# Patient Record
Sex: MALE | Race: BLACK OR AFRICAN AMERICAN | NOT HISPANIC OR LATINO | URBAN - METROPOLITAN AREA
[De-identification: names, ages, dates, MRNs, and addresses within clinical notes are randomized per-mention and may not be internally consistent; named-entity substitution may affect disease eponyms.]

---

## 2017-08-30 ENCOUNTER — EMERGENCY (EMERGENCY)
Facility: HOSPITAL | Age: 27
LOS: 1 days | Discharge: HOME | End: 2017-08-30
Admitting: PSYCHIATRY & NEUROLOGY
Payer: MEDICAID

## 2017-08-30 VITALS
HEART RATE: 112 BPM | OXYGEN SATURATION: 100 % | RESPIRATION RATE: 20 BRPM | SYSTOLIC BLOOD PRESSURE: 151 MMHG | DIASTOLIC BLOOD PRESSURE: 93 MMHG | TEMPERATURE: 98 F

## 2017-08-30 DIAGNOSIS — F84.0 AUTISTIC DISORDER: ICD-10-CM

## 2017-08-30 PROCEDURE — 93010 ELECTROCARDIOGRAM REPORT: CPT

## 2017-08-30 PROCEDURE — 99284 EMERGENCY DEPT VISIT MOD MDM: CPT | Mod: 25

## 2017-08-30 PROCEDURE — 90792 PSYCH DIAG EVAL W/MED SRVCS: CPT | Mod: GC

## 2017-08-30 NOTE — ED PROVIDER NOTE - OBJECTIVE STATEMENT
This is a 27 year old Male PMHx Autism ASD Cognitive impairment BIB family for psych eval r/t aggression. Pt calm and cooperative able to follow directions. Responds yes/no to questions secondary to limited verbal skills. Mother reports she has been having a harder time redirecting her son lately. Mother state he is aggressive at home, kicking doors and she feels frightened.

## 2017-08-30 NOTE — ED PROVIDER NOTE - MEDICAL DECISION MAKING DETAILS
This is a 27 year old Male PMHx Autism ASD Cognitive impairment BIB family for psych eval r/t aggression. Pt calm and cooperative able to follow directions. Responds yes/no to questions secondary to limited verbal skills. Mother reports she has been having a harder time redirecting her son lately. Mother state he is aggressive at home, kicking doors and she feels frightened.  Medical evaluation performed. There is no clinical evidence of intoxication or any acute medical problem requiring immediate intervention. Final disposition will be determined by psychiatrist.

## 2017-08-30 NOTE — ED BEHAVIORAL HEALTH ASSESSMENT NOTE - DESCRIPTION
calm cooperative none Pt with ASD, cognitively impaired, resides in private residence with mother and father, 2 brothers not involved in the patient's care, Pt has been in day program [facility], currently in process of completing workup to begin Partial Hospitalization day program at [facility].
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
## 2017-08-30 NOTE — ED BEHAVIORAL HEALTH ASSESSMENT NOTE - SUMMARY
Patient is a 28yo AA M with ASD, no significant PMHx, PPHx of 3 prior inpatient psychiatric admissions (last hospitalization 2 years prior), in outpatient psychiatric treatment (sees Dr. Marcial Contreras) hx of behavioral outbursts with low frustration tolerance since puberty, daily tobacco use, no known hx of substance use, no legal hx, BIB mother, in context of aggressive behavioral outburst.    Pt presents calm, cooperative, and in appropriate behavioral control while under observation in the ER. Patient's presentation of aggressive behaviors are in contest of chronic poor frustration tolerance, rigidity, and cognitive impairment associated with autism spectrum d/o. Pt with limited verbal skills, but able to deny thoughts of harming his mother. Given aggression is 2/2 to behavioral problem, inpatient psychiatric admission would not likely ameliorate this behavior, rather patient would benefit from long term behavioral therapy in an outpatient setting. Discussed held with patient's mother in regards to behavioral strategies at this time, to limit aggressive behaviors. She was provided with resources including information about nearby hospitals with CPEPs, information about the [facility], and instructed to reach out to psychiatrist Dr. Contreras for urgent follow-up. Message was left for Dr. Contreras by writer. Given patient able to demonstrate appropriate calm behaviors while in the ER, he does not present in imminent risk of harm, and does not meet criteria for involuntary psychiatric admission.

## 2017-08-30 NOTE — ED BEHAVIORAL HEALTH ASSESSMENT NOTE - RISK ASSESSMENT
Pt has baseline elevated risk of harm, given chronic risk factors of ASD, cognitive impairment, poor frustration tolerance, impulsivity, aggression, previous inpatient psychiatric hospitalizations, with acute risk factor of recent aggression. Pt has numerous protective factors including currently in treatment, compliant with medication and treatment, pt currently presents calm and cooperative in ER, denies S/H I/I/P, denies thoughts of harming mom, no previous reported SA or self injury per patients mother, family support system, no illicit substance misuse, no evidence of acute psychosis, on BARRERA. Although pt has chronic risk of harm of aggression in context to  unmodifiable risk factor of cognitive impairment from ASD, pt's symptoms would not likely be ameliorated by inpatient admission at this time.

## 2017-08-30 NOTE — ED BEHAVIORAL HEALTH ASSESSMENT NOTE - OTHER
intact during assessment, however historically impaired concrete, limited impaired limited/impaired 2/2 ASD unable to appropriately assess 2/2 limited verbal skills

## 2017-08-30 NOTE — ED BEHAVIORAL HEALTH ASSESSMENT NOTE - OTHER PAST PSYCHIATRIC HISTORY (INCLUDE DETAILS REGARDING ONSET, COURSE OF ILLNESS, INPATIENT/OUTPATIENT TREATMENT)
3 prior inpatient psychiatric hospitalizations. First hospitalized 7-8 years ago at [facility] and then subsequent hospitalization at [facility]. Most recent hospitalization at [facility] 2 years prior. All hospitalizations in context of aggressive behaviors per mother. Pt's mother reports pt began to present with worsening aggression 8 years ago- she is aware that the worsening aggression began likely 2/2 symptoms of puberty- which in this patient population frequently cause elevated aggression.

## 2017-08-30 NOTE — ED BEHAVIORAL HEALTH ASSESSMENT NOTE - SAFETY PLAN DETAILS
Discussed with pt's mother to call 911 or return to nearest ER if patient demonstrates aggressive behavior. Pt's mother provided with resources of [facility] hospitals in the area.

## 2017-08-30 NOTE — ED PROVIDER NOTE - PMH
<<----- Click to add NO pertinent Past Medical History ASD (atrial septal defect)    Autism    Cognitive impairment

## 2017-08-30 NOTE — ED PROVIDER NOTE - CARE PLAN
Principal Discharge DX:	Autism Principal Discharge DX:	Autism  Secondary Diagnosis:	Cognitive impairment

## 2017-08-30 NOTE — ED BEHAVIORAL HEALTH ASSESSMENT NOTE - CASE SUMMARY
pt seen and examined. case discussed with Dr. Gonzalez. In summary this is a 28yo AA M with ASD, no significant PMHx, PPHx of 3 prior inpatient psychiatric admissions (last hospitalization 2 years prior), in outpatient psychiatric treatment (sees Dr. Marcial Contreras) hx of behavioral outbursts with low frustration tolerance since puberty, daily tobacco use, no known hx of substance use, no legal hx, BIB mother, in context of aggressive behavioral outburst. On evaluation the pt is calm and cooperative. He demonstrates limited coping skills and cognitive abilities consistent withe the diagnosis of ASD, limited verbal abilities. In my medical opinion the pt is currently at his baseline mental status, poor coping skills and low frustration tolerance. Denies wanting to hurt himself or others. mother reports that the pt has been acting out when she sets limits while her  is away. pt would not benefit from psychiatric hospitalization. mother in agreement to take him home.

## 2017-08-30 NOTE — ED BEHAVIORAL HEALTH ASSESSMENT NOTE - REFERRAL / APPOINTMENT DETAILS
Message left for psychiatrist Dr. Contreras; pt's mother also instructed to call psychiatrist for urgent outpatient appointment on Monday (9/4/17) when Dr. Contreras returns to office.  Pt has neurologist appointment on 9/5/17
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
## 2018-01-12 ENCOUNTER — EMERGENCY (EMERGENCY)
Facility: HOSPITAL | Age: 28
LOS: 0 days | Discharge: HOME | End: 2018-01-12
Attending: EMERGENCY MEDICINE
Payer: COMMERCIAL

## 2018-01-12 VITALS
SYSTOLIC BLOOD PRESSURE: 156 MMHG | TEMPERATURE: 98 F | RESPIRATION RATE: 20 BRPM | HEIGHT: 70 IN | DIASTOLIC BLOOD PRESSURE: 93 MMHG | WEIGHT: 257.94 LBS | OXYGEN SATURATION: 96 % | HEART RATE: 104 BPM

## 2018-01-12 VITALS
OXYGEN SATURATION: 98 % | SYSTOLIC BLOOD PRESSURE: 146 MMHG | TEMPERATURE: 98 F | DIASTOLIC BLOOD PRESSURE: 93 MMHG | HEART RATE: 91 BPM | RESPIRATION RATE: 19 BRPM

## 2018-01-12 DIAGNOSIS — F79 UNSPECIFIED INTELLECTUAL DISABILITIES: ICD-10-CM

## 2018-01-12 DIAGNOSIS — F20.9 SCHIZOPHRENIA, UNSPECIFIED: ICD-10-CM

## 2018-01-12 DIAGNOSIS — F17.200 NICOTINE DEPENDENCE, UNSPECIFIED, UNCOMPLICATED: ICD-10-CM

## 2018-01-12 DIAGNOSIS — F84.0 AUTISTIC DISORDER: ICD-10-CM

## 2018-01-12 LAB
ALBUMIN SERPL ELPH-MCNC: 3.5 G/DL — SIGNIFICANT CHANGE UP (ref 3.3–5)
ALP SERPL-CCNC: 35 U/L — LOW (ref 40–120)
ALT FLD-CCNC: 18 U/L — SIGNIFICANT CHANGE UP (ref 12–78)
ANION GAP SERPL CALC-SCNC: 10 MMOL/L — SIGNIFICANT CHANGE UP (ref 5–17)
ANISOCYTOSIS BLD QL: SLIGHT — SIGNIFICANT CHANGE UP
APAP SERPL-MCNC: <2 UG/ML — LOW (ref 10–30)
AST SERPL-CCNC: 9 U/L — LOW (ref 15–37)
BASO STIPL BLD QL SMEAR: PRESENT — SIGNIFICANT CHANGE UP
BASOPHILS # BLD AUTO: 0.1 K/UL — SIGNIFICANT CHANGE UP (ref 0–0.2)
BASOPHILS NFR BLD AUTO: 0.9 % — SIGNIFICANT CHANGE UP (ref 0–2)
BILIRUB SERPL-MCNC: 0.3 MG/DL — SIGNIFICANT CHANGE UP (ref 0.2–1.2)
BUN SERPL-MCNC: 9 MG/DL — SIGNIFICANT CHANGE UP (ref 7–23)
CALCIUM SERPL-MCNC: 8.4 MG/DL — LOW (ref 8.5–10.1)
CHLORIDE SERPL-SCNC: 106 MMOL/L — SIGNIFICANT CHANGE UP (ref 96–108)
CO2 SERPL-SCNC: 23 MMOL/L — SIGNIFICANT CHANGE UP (ref 22–31)
CREAT SERPL-MCNC: 0.67 MG/DL — SIGNIFICANT CHANGE UP (ref 0.5–1.3)
EOSINOPHIL # BLD AUTO: 0.4 K/UL — SIGNIFICANT CHANGE UP (ref 0–0.5)
EOSINOPHIL NFR BLD AUTO: 4.1 % — SIGNIFICANT CHANGE UP (ref 0–6)
ETHANOL SERPL-MCNC: <10 MG/DL — SIGNIFICANT CHANGE UP (ref 0–10)
GLUCOSE SERPL-MCNC: 91 MG/DL — SIGNIFICANT CHANGE UP (ref 70–99)
HCT VFR BLD CALC: 44.2 % — SIGNIFICANT CHANGE UP (ref 39–50)
HGB BLD-MCNC: 13.3 G/DL — SIGNIFICANT CHANGE UP (ref 13–17)
HYPOCHROMIA BLD QL: SLIGHT — SIGNIFICANT CHANGE UP
LYMPHOCYTES # BLD AUTO: 4 K/UL — HIGH (ref 1–3.3)
LYMPHOCYTES # BLD AUTO: 43.6 % — SIGNIFICANT CHANGE UP (ref 13–44)
MCHC RBC-ENTMCNC: 19.2 PG — LOW (ref 27–34)
MCHC RBC-ENTMCNC: 30.2 GM/DL — LOW (ref 32–36)
MCV RBC AUTO: 63.7 FL — LOW (ref 80–100)
MICROCYTES BLD QL: SLIGHT — SIGNIFICANT CHANGE UP
MONOCYTES # BLD AUTO: 0.7 K/UL — SIGNIFICANT CHANGE UP (ref 0–0.9)
MONOCYTES NFR BLD AUTO: 7.5 % — SIGNIFICANT CHANGE UP (ref 2–14)
NEUTROPHILS # BLD AUTO: 4.1 K/UL — SIGNIFICANT CHANGE UP (ref 1.8–7.4)
NEUTROPHILS NFR BLD AUTO: 43.9 % — SIGNIFICANT CHANGE UP (ref 43–77)
PLAT MORPH BLD: NORMAL — SIGNIFICANT CHANGE UP
PLATELET # BLD AUTO: 258 K/UL — SIGNIFICANT CHANGE UP (ref 150–400)
POIKILOCYTOSIS BLD QL AUTO: SLIGHT — SIGNIFICANT CHANGE UP
POLYCHROMASIA BLD QL SMEAR: SLIGHT — SIGNIFICANT CHANGE UP
POTASSIUM SERPL-MCNC: 4 MMOL/L — SIGNIFICANT CHANGE UP (ref 3.5–5.3)
POTASSIUM SERPL-SCNC: 4 MMOL/L — SIGNIFICANT CHANGE UP (ref 3.5–5.3)
PROT SERPL-MCNC: 7.2 GM/DL — SIGNIFICANT CHANGE UP (ref 6–8.3)
RBC # BLD: 6.93 M/UL — HIGH (ref 4.2–5.8)
RBC # FLD: 15.2 % — HIGH (ref 11–15)
RBC BLD AUTO: ABNORMAL
SALICYLATES SERPL-MCNC: <1.7 MG/DL — LOW (ref 2.8–20)
SODIUM SERPL-SCNC: 139 MMOL/L — SIGNIFICANT CHANGE UP (ref 135–145)
WBC # BLD: 9.3 K/UL — SIGNIFICANT CHANGE UP (ref 3.8–10.5)
WBC # FLD AUTO: 9.3 K/UL — SIGNIFICANT CHANGE UP (ref 3.8–10.5)

## 2018-01-12 PROCEDURE — 99284 EMERGENCY DEPT VISIT MOD MDM: CPT | Mod: 25

## 2018-01-12 PROCEDURE — 90792 PSYCH DIAG EVAL W/MED SRVCS: CPT

## 2018-01-12 RX ORDER — RISPERIDONE 4 MG/1
4 TABLET ORAL ONCE
Qty: 0 | Refills: 0 | Status: COMPLETED | OUTPATIENT
Start: 2018-01-12 | End: 2018-01-12

## 2018-01-12 RX ORDER — BENZTROPINE MESYLATE 1 MG
0.5 TABLET ORAL ONCE
Qty: 0 | Refills: 0 | Status: COMPLETED | OUTPATIENT
Start: 2018-01-12 | End: 2018-01-12

## 2018-01-12 RX ADMIN — RISPERIDONE 4 MILLIGRAM(S): 4 TABLET ORAL at 11:27

## 2018-01-12 RX ADMIN — Medication 0.5 MILLIGRAM(S): at 11:27

## 2018-01-12 NOTE — ED BEHAVIORAL HEALTH ASSESSMENT NOTE - RISK ASSESSMENT
Chronic risks but no acute symptoms: mood stable, no statements of si or hi, no psychosis, calm, no aggression, compliant with program and meds, sleep poor though, nicotine addiction. supportive parents.

## 2018-01-12 NOTE — ED PROVIDER NOTE - MEDICAL DECISION MAKING DETAILS
Patient with autism, possible schizophrenic history.  VSS, labs & EKG wnl.  Unable to get response from patient's family.  Patient pending psych/SW this morning.  No apparent medical issues. Patient signed out to incoming physician.  All decisions regarding the progression of care will be made at their discretion.

## 2018-01-12 NOTE — ED BEHAVIORAL HEALTH ASSESSMENT NOTE - DESCRIPTION
Calm, sleeping comfortably. see hpi the patient is a 27 year old male, single, disabled, lives at home with parents

## 2018-01-12 NOTE — ED ADULT NURSE NOTE - CHPI ED SYMPTOMS NEG
no change in level of consciousness/no confusion/no disorientation/no weakness/no hallucinations/no agitation/no weight loss/no suicidal/no homicidal/no paranoia

## 2018-01-12 NOTE — ED PROVIDER NOTE - PHYSICAL EXAMINATION
Gen: Sleepy, obese, rousable  Head: NC, AT, EOMI, normal lids/conjunctiva  Neck: +supple, +Trachea midline  Pulm: Bilateral BS, normal resp effort, no wheeze/stridor/retractions  CV: RRR, no M/R/G  Abd: soft, NT/ND  Mskel: no edema/erythema/cyanosis  Skin: no rash, warm/dry  Neuro: No sensory/motor deficits
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
## 2018-01-12 NOTE — ED BEHAVIORAL HEALTH ASSESSMENT NOTE - HPI (INCLUDE ILLNESS QUALITY, SEVERITY, DURATION, TIMING, CONTEXT, MODIFYING FACTORS, ASSOCIATED SIGNS AND SYMPTOMS)
Briefly, the patient is a 27 year old male, single, disabled, lives at home with parents, has no pertinent medical issues, has a psychiatric history significant for Autism, Moderate-Severe Intellectual Disability, Schizophrenia, has a history of inpatient psychiatric admission- last admitted 2 years ago, ongoing Nicotine dependence, no drug or alcohol abuse, no legal issues, presented to ed via ems called for by mother with complaints that patient had not been sleeping for 3 days. No aggression or agitation in ed. ED team was unable to contact family, and so patient remained till morning for a psychiatric consultation.   Met with the patient. He does not engage in any meaningful conversation due to his intellectual disability. Calm, no distress, sleeping peacefully through the night and this morning.    Obtained contact information from [facility]. Called mother. She states patient has moderate-severe intellectual disability, and at home, is respectful and listens to his parents. He attends daily day program, and apparently is not allowed to smoke cigarettes there. Resultingly he tends to keep asking his parents for cigarettes all night. He has been doing this for the last 3 days, and has not been sleeping. He sleeps on his bus ride to the program. She states that he is not aggressive or agitated at home. Appetite is fair. He is dependent for his needs but can do his own ADL's with some supervision from parents. Discussed with mother the current medications, some minor changes that can be done, and also the importance that she closely coordinate with day program/psychiatric provider about treatment for nicotine dependence and sleep. She agrees. She agrees with discharge and father is on his way to pick patient up. Questions and concerns answered. No mood symptoms, no statements of si or hi, and no psychosis. Compliant with medications.

## 2018-01-12 NOTE — ED BEHAVIORAL HEALTH ASSESSMENT NOTE - SUMMARY
Briefly, the patient is a 27 year old male, single, disabled, lives at home with parents, has no pertinent medical issues, has a psychiatric history significant for Autism, Moderate-Severe Intellectual Disability, Schizophrenia, has a history of inpatient psychiatric admission- last admitted 2 years ago, ongoing Nicotine dependence, no drug or alcohol abuse, no legal issues, presented to ed via ems called for by mother with complaints that patient had not been sleeping for 3 days. No aggression or agitation in ed. ED team was unable to contact family, and so patient remained till morning for a psychiatric consultation.   Met with the patient. He does not engage in any meaningful conversation due to his intellectual disability. Calm, no distress, sleeping peacefully through the night and this morning.  Based on assessment done today and collateral obtained, patient has severe intellectual disability at baseline, is calm, with no aggression or agitation at home. He is compliant. He attends daily day program. No impulsivity. Sleep has been poor at home, and he has been smoking cigarettes which is the concern that the mother had.   At this point, patient does not present with acute issues that would make him at risk to hurt self or others. He does not require an inpatient psychiatric admission. He should resume daily day program. Discussed with mother the current medications, some minor changes that can be done, and also the importance that she closely coordinate with day program/psychiatric provider about treatment for nicotine dependence and sleep. She agrees. She agrees with discharge and father is on his way to pick patient up. Questions and concerns answered. No mood symptoms, no statements of si or hi, and no psychosis. Compliant with medications.

## 2018-01-12 NOTE — ED BEHAVIORAL HEALTH ASSESSMENT NOTE - OTHER PAST PSYCHIATRIC HISTORY (INCLUDE DETAILS REGARDING ONSET, COURSE OF ILLNESS, INPATIENT/OUTPATIENT TREATMENT)
has a psychiatric history significant for Autism, Moderate-Severe Intellectual Disability, Schizophrenia, has a history of inpatient psychiatric admission- last admitted 2 years ago, ongoing Nicotine dependence, no drug or alcohol abuse, no legal issues

## 2018-01-12 NOTE — ED PROVIDER NOTE - PROGRESS NOTE DETAILS
Ketan: pt signed out to me, hx ?schizophrenia and autism, mother sent in pt because ?hadn't been sleeping. no si/hi/ah. pending psych/SW. medically cleared with labs and ekg wnl. Ketan: seen by psych, mother , is working on ride, pt safe for d/c. psych gave recommendations to mother.

## 2018-01-12 NOTE — ED BEHAVIORAL HEALTH ASSESSMENT NOTE - OTHER
MR SLEEP ISSUES did not assess cannot assess cannot assess- baseline severe MR nicotine addiction variable VARIABLE

## 2018-01-12 NOTE — ED BEHAVIORAL HEALTH ASSESSMENT NOTE - CURRENT MEDICATION
Thorazine 100mg q hs po, Depakote 250mg 2 tabs in am + 3 tabs in hs, Risperidone 4mg bid po, Cogentin 0.5mg bid po. Risperidone consta- last dose on 1/9/2017

## 2018-01-13 DIAGNOSIS — F09 UNSPECIFIED MENTAL DISORDER DUE TO KNOWN PHYSIOLOGICAL CONDITION: ICD-10-CM

## 2018-01-13 DIAGNOSIS — F91.9 CONDUCT DISORDER, UNSPECIFIED: ICD-10-CM

## 2018-01-13 DIAGNOSIS — Q21.1 ATRIAL SEPTAL DEFECT: ICD-10-CM

## 2018-01-13 DIAGNOSIS — F84.0 AUTISTIC DISORDER: ICD-10-CM

## 2018-09-21 NOTE — ED ADULT NURSE NOTE - DOES PATIENT HAVE ADVANCE DIRECTIVE
Patient was last seen on 8/31/18 by Mone Ohara PA-C. BP was 110/78. Pulse 76. Weight was 308lb. Last refill of the phentermine-topiramate on 8/31/18 #14. Unable to refill medication. Please advise.    No

## 2019-04-05 ENCOUNTER — EMERGENCY (EMERGENCY)
Facility: HOSPITAL | Age: 29
LOS: 1 days | Discharge: HOME | End: 2019-04-05
Attending: INTERNAL MEDICINE | Admitting: INTERNAL MEDICINE
Payer: COMMERCIAL

## 2019-04-05 VITALS
WEIGHT: 315 LBS | HEIGHT: 71 IN | TEMPERATURE: 98 F | RESPIRATION RATE: 16 BRPM | OXYGEN SATURATION: 94 % | HEART RATE: 100 BPM | DIASTOLIC BLOOD PRESSURE: 71 MMHG | SYSTOLIC BLOOD PRESSURE: 144 MMHG

## 2019-04-05 LAB
ALBUMIN SERPL ELPH-MCNC: 3.5 G/DL — SIGNIFICANT CHANGE UP (ref 3.3–5)
ALP SERPL-CCNC: 42 U/L — SIGNIFICANT CHANGE UP (ref 30–120)
ALT FLD-CCNC: 27 U/L DA — SIGNIFICANT CHANGE UP (ref 10–60)
AMPHET UR-MCNC: NEGATIVE — SIGNIFICANT CHANGE UP
ANION GAP SERPL CALC-SCNC: 10 MMOL/L — SIGNIFICANT CHANGE UP (ref 5–17)
APAP SERPL-MCNC: <1 — SIGNIFICANT CHANGE UP (ref 10–30)
AST SERPL-CCNC: 14 U/L — SIGNIFICANT CHANGE UP (ref 10–40)
BARBITURATES UR SCN-MCNC: NEGATIVE — SIGNIFICANT CHANGE UP
BENZODIAZ UR-MCNC: NEGATIVE — SIGNIFICANT CHANGE UP
BILIRUB SERPL-MCNC: 0.3 MG/DL — SIGNIFICANT CHANGE UP (ref 0.2–1.2)
BUN SERPL-MCNC: 9 MG/DL — SIGNIFICANT CHANGE UP (ref 7–23)
CALCIUM SERPL-MCNC: 8.9 MG/DL — SIGNIFICANT CHANGE UP (ref 8.4–10.5)
CHLORIDE SERPL-SCNC: 106 MMOL/L — SIGNIFICANT CHANGE UP (ref 96–108)
CO2 SERPL-SCNC: 28 MMOL/L — SIGNIFICANT CHANGE UP (ref 22–31)
COCAINE METAB.OTHER UR-MCNC: NEGATIVE — SIGNIFICANT CHANGE UP
CREAT SERPL-MCNC: 0.8 MG/DL — SIGNIFICANT CHANGE UP (ref 0.5–1.3)
ETHANOL SERPL-MCNC: <3 MG/DL — SIGNIFICANT CHANGE UP (ref 0–3)
GLUCOSE SERPL-MCNC: 114 MG/DL — HIGH (ref 70–99)
HCT VFR BLD CALC: 46.7 % — SIGNIFICANT CHANGE UP (ref 39–50)
HGB BLD-MCNC: 13.9 G/DL — SIGNIFICANT CHANGE UP (ref 13–17)
MCHC RBC-ENTMCNC: 19.5 PG — LOW (ref 27–34)
MCHC RBC-ENTMCNC: 29.8 GM/DL — LOW (ref 32–36)
MCV RBC AUTO: 65.7 FL — LOW (ref 80–100)
METHADONE UR-MCNC: NEGATIVE — SIGNIFICANT CHANGE UP
NRBC # BLD: 0 /100 WBCS — SIGNIFICANT CHANGE UP (ref 0–0)
OPIATES UR-MCNC: NEGATIVE — SIGNIFICANT CHANGE UP
PCP SPEC-MCNC: SIGNIFICANT CHANGE UP
PCP UR-MCNC: NEGATIVE — SIGNIFICANT CHANGE UP
PLATELET # BLD AUTO: 274 K/UL — SIGNIFICANT CHANGE UP (ref 150–400)
POTASSIUM SERPL-MCNC: 4.2 MMOL/L — SIGNIFICANT CHANGE UP (ref 3.5–5.3)
POTASSIUM SERPL-SCNC: 4.2 MMOL/L — SIGNIFICANT CHANGE UP (ref 3.5–5.3)
PROT SERPL-MCNC: 7 G/DL — SIGNIFICANT CHANGE UP (ref 6–8.3)
RBC # BLD: 7.11 M/UL — HIGH (ref 4.2–5.8)
RBC # FLD: 20.1 % — HIGH (ref 10.3–14.5)
SALICYLATES SERPL-MCNC: 0.8 MG/DL — LOW (ref 2.8–20)
SODIUM SERPL-SCNC: 144 MMOL/L — SIGNIFICANT CHANGE UP (ref 135–145)
THC UR QL: NEGATIVE — SIGNIFICANT CHANGE UP
WBC # BLD: 10.05 K/UL — SIGNIFICANT CHANGE UP (ref 3.8–10.5)
WBC # FLD AUTO: 10.05 K/UL — SIGNIFICANT CHANGE UP (ref 3.8–10.5)

## 2019-04-05 PROCEDURE — 90792 PSYCH DIAG EVAL W/MED SRVCS: CPT | Mod: GT

## 2019-04-05 PROCEDURE — 93010 ELECTROCARDIOGRAM REPORT: CPT

## 2019-04-05 PROCEDURE — 99284 EMERGENCY DEPT VISIT MOD MDM: CPT

## 2019-04-05 NOTE — ED CLERICAL - CLERICAL COMMENTS
pt was in  at [facility].  Patient lives at home.  Called mother and Braydon which are the emergency contacts on the file.  Called at [phone] and left message for both. pt was in  at [facility].  Patient lives at home.  Called mother and Braydon which are the emergency contacts on the file.  Called at 2150 and left message for both. both called back at [phone] talked to dr. weinberg

## 2019-04-05 NOTE — ED BEHAVIORAL HEALTH ASSESSMENT NOTE - PSYCHIATRIC ISSUES AND PLAN (INCLUDE STANDING AND PRN MEDICATION)
PRNs - Haldol 5 mg PO/IM q6h as needed agitation/severe agitation, Ativan 2 mg PO/IM q6h as needed anxiety/severe anxiety, Benadryl 50 mg PO/IM q6h as needed EPS prophylaxis; please give nighttime doses of medications if available

## 2019-04-05 NOTE — ED PROVIDER NOTE - PSYCHIATRIC, MLM
Alert and oriented to person, place,  no apparent risk to self or others. The patient is cooperative and calm in the emergency room

## 2019-04-05 NOTE — ED BEHAVIORAL HEALTH ASSESSMENT NOTE - SUBSTANCE ISSUES AND PLAN (INCLUDE STANDING AND PRN MEDICATION)
Recommend Nicoderm patch 21 mg given pt's significant tobacco use and tendency for nicotine cravings to contribute to frustration

## 2019-04-05 NOTE — ED ADULT NURSE NOTE - OBJECTIVE STATEMENT
received pt BIBA; per transfer papers and EMT, pt had "frequent verbal threatening outburst" while at his day program. pt is awake and alert. appears to respond to auditory and/or visual hallucinations. did not/could not provide any information when being assessed. cooperative nursing procedures. initiated on 1:1 observation for pt safety

## 2019-04-05 NOTE — ED BEHAVIORAL HEALTH ASSESSMENT NOTE - SUMMARY
Hakeem is a 30 y/o AA male, currently living in [place] with mother and father, attending day program at [facility], has PMH of Asthma, has a psychiatric history significant for Autism, Moderate-Severe Intellectual Disability, Schizophrenia (without any specific detail of this), has a history of inpatient psychiatric admission- last admitted 3 years ago, ongoing Nicotine dependence, no drug or alcohol abuse, no legal issues, presented to ed via ems from [facility] day program. At this point, it is difficult to establish disposition as collateral information from his day program is unavailable, as it is closed. There is no clear indication for psychiatric hospitalization as his issues are largely chronic in nature, and related to poor frustration tolerance, which is not a target for treatment on an inpatient psychiatric unit. Furthermore, there is no clear evidence of a psychiatric decompensation, without any clear evidence of any psychotic symptoms at present, and he is calm, cooperative and compliant in the Emergency Department. He also remains compliant with his medications and his day treatment. The likely needs for Hakeem involve more long term planning and case management, which can be accomplished as an outpatient. However, prior to discharge, it is necessary for our team to speak with program staff to understand pt's recent functioning and triggering events leading to his referral to the Emergency Department. He will therefore be held overnight for additional collateral.

## 2019-04-05 NOTE — ED ADULT NURSE NOTE - OTHER
appears to respond to visual and/or auditory hallucinations pt unwilling or unable to communicate his needs or his history at this time

## 2019-04-05 NOTE — ED BEHAVIORAL HEALTH ASSESSMENT NOTE - RISK ASSESSMENT
Acute Suicide Risk  (  ) High   (  ) Moderate   (  ) Low   ( X ) Unable to determine   Rationale: need additional collateral; appears low, as patient has no history of self-harm and/or suicide, nor was this mentioned as a presenting complaint, but will determine with additional collateral in AM     Elevated Chronic Risk:   Yes - patient has elevated chronic risk of harm to others given his intellectual disability, hx of agitation, impulsive behavior, psychiatric hospitalization and male sex.

## 2019-04-05 NOTE — ED BEHAVIORAL HEALTH ASSESSMENT NOTE - OTHER PAST PSYCHIATRIC HISTORY (INCLUDE DETAILS REGARDING ONSET, COURSE OF ILLNESS, INPATIENT/OUTPATIENT TREATMENT)
has a psychiatric history significant for Autism, Moderate-Severe Intellectual Disability, Schizophrenia, has a history of inpatient psychiatric admission- last admitted 2 years ago, ongoing Nicotine dependence, no drug or alcohol abuse, no legal issues has a psychiatric history significant for Autism, Moderate-Severe Intellectual Disability, Schizophrenia, has a history of inpatient psychiatric admission- last admitted 3 years ago, no known SIB or suicide attempt

## 2019-04-05 NOTE — ED BEHAVIORAL HEALTH ASSESSMENT NOTE - DESCRIPTION
see hpi the patient is a 27 year old male, single, disabled, lives at home with parents asthma lives with parents, limited social life, but does go to program; parents have no effective case management which could provide social/respite opportunities for patient patient is calm and cooperative with staff in Emergency Department; he requested food, which was provided; he is asking when he can go home, but in a calm manner

## 2019-04-05 NOTE — ED BEHAVIORAL HEALTH ASSESSMENT NOTE - NS ED BHA PLAN TR REFERRAL APPT DISCUSSED2 FT
The patient will return to his day program next week and has an appointment with his psych NP on Wednesday.

## 2019-04-05 NOTE — ED BEHAVIORAL HEALTH ASSESSMENT NOTE - CURRENT MEDICATION
Depakote 500 qAM, 750 mg qHS  Risperdal consta 2x/month - 100 mg   Risperdal 4 mg twice daily - now reduced to 4 mg qHS only   Cogentin 0.5 mg twice daily  Chlorpromazine 100 mg qHS

## 2019-04-05 NOTE — ED PROVIDER NOTE - OBJECTIVE STATEMENT
28 y/o BM h/o ASD (atrial septal defect)  Autism  Cognitive impairment 28 y/o BM h/o Schizophrenia, Asthma,   Cognitive impairment 28 y/o BM h/o Schizophrenia, Asthma,   Cognitive impairment. The patient was referred to  from [facility] Day care. He reportedly was displaying violent outbursts and threatening behavior. His mother states that a similar episode occurred on Wednesday. His mother is fearful when he has similar out burst at home. He typically bangs on her bedroom door at night because he is looking to smokes cigars. She doesn't allow smoking because she is fearful that he will burn the house down. His behavior is impulsive but he is not know to hurt any one, no does he have suicidal tendency's. He sees a nurse practitioner for his prescriptions, but hasn't seen Dr Contreras, Psychiatrist in one year. His mother believes that he needs to be admitted into the hospital. Mrs. Smallwood number is [phone]

## 2019-04-05 NOTE — ED BEHAVIORAL HEALTH ASSESSMENT NOTE - NS ED BHA PLAN TR OTHER2 FT
Collateral obtained from the day program (spoke to [place], nurse). She reports the patient is generally "quiet as a kitten" so sent him to the ED yesterday when he became agitated (flailing arms, threatening, cursing). She reports they are generally able to handle his agitation but were worried someone would be injured by him flailing his arms as "he is a bigger andrew." The program is accepting him back on Monday and will set up an appointment for him to see the psych NP on Wednesday.

## 2019-04-05 NOTE — ED BEHAVIORAL HEALTH ASSESSMENT NOTE - NS ED BHA PLAN TR SAFTETY PLAN DISCUSSED2 FT
The patient had no incidents in the ED overnight (he slept, ate, and had no agitation). Discussion with mom included a recommendation for the patient to return to the ED if there are anymore incidents of agitation in the home or if the patient expresses suicidality or homicidiality.

## 2019-04-05 NOTE — ED BEHAVIORAL HEALTH ASSESSMENT NOTE - HPI (INCLUDE ILLNESS QUALITY, SEVERITY, DURATION, TIMING, CONTEXT, MODIFYING FACTORS, ASSOCIATED SIGNS AND SYMPTOMS)
As per patient, he was screaming at the front table.  He says that he hurt himself on the table and yelled. He also says that the day before, he was getting upset about somethin, but he can't remember why he was upset.      Spoke with pt's mother (Mrs. Peguero - [phone], [phone]), who says that Hakeem goes to a day program for the past 1 1/2 years. They have not had any trouble, but on Wednesday, they called the mother asking if they could pick him up because he was yelling and "wasn't himself." Yesterday, he went to program and didn't have any issues. At night, he has been having trouble sleeping, staying awake smoking his "Black and Milds," 10-15 per night, and she believes that it keeps him up.  He tends to get very anxious and aggressive when he cannot get his cigars.  They worry that he will drop it and cause a fire, so they tell him to come every 2 hours for one, but he comes every 15 minutes.  They say that for months, he will bang on the door to request a cigar, yelling at them if he doesn't get his cigar.  They end up giving it to him when he asks.  Today, she is not sure what happened, but knows that he was sent from the program, which is now closed. As per patient, he was screaming at the front table.  He says that he hurt himself on the table and yelled. He also says that the day before, he was getting upset about somethin, but he can't remember why he was upset.      Spoke with pt's mother (Mrs. Peguero - [phone], [phone]), who says that Hakeem goes to a day program for the past 1 1/2 years. They have not had any trouble, but on Wednesday, they called the mother asking if they could pick him up because he was yelling and "wasn't himself." Yesterday, he went to program and didn't have any issues. At night, he has been having trouble sleeping, staying awake smoking his "Black and Milds," 10-15 per night, and she believes that it keeps him up.  He tends to get very anxious and aggressive when he cannot get his cigars.  They worry that he will drop it and cause a fire, so they tell him to come every 2 hours for one, but he comes every 15 minutes.  They say that for months, he will bang on the door to request a cigar, yelling at them if he doesn't get his cigar.  They end up giving it to him when he asks.  Today, she is not sure what happened, but knows that he was sent from the program, which is now closed.    They are trying to get him on a waiting list for a residential placement. They do not have any agency or services, not being connected with [employer].  She did reach out to them, and they came to their home, but she says that he was not accepted, and they were sent to another program, but she could not make the appointment.  She says she is trying to get another appointment, but has not been able to reach them. She has a  through "[facility] but they do not take her calls, she says.  She has spoken with the SW at the program, who told them that a nursing home would be the only option they know of.     Developmentally, he did not speak until 3 years old.  She knows that his IQ had gone down over time.      1 1/2 years ago -   yells at night, neighbors complaining    [facility] - [place] As per patient, he was screaming at the front table.  He says that he hurt himself on the table and yelled. He also says that the day before, he was getting upset about somethin, but he can't remember why he was upset.      Spoke with pt's mother (Mrs. Peguero - [phone], [phone]), who says that Hakeem goes to a day program for the past 1 1/2 years. They have not had any trouble, but on Wednesday, they called the mother asking if they could pick him up because he was yelling and "wasn't himself." Yesterday, he went to program and didn't have any issues. At night, he has been having trouble sleeping, staying awake smoking his "Black and Milds," 10-15 per night, and she believes that it keeps him up.  He tends to get very anxious and aggressive when he cannot get his cigars.  They worry that he will drop it and cause a fire, so they tell him to come every 2 hours for one, but he comes every 15 minutes.  They say that for months, he will bang on the door to request a cigar, yelling at them if he doesn't get his cigar.  They end up giving it to him when he asks.  Today, she is not sure what happened, but knows that he was sent from the program, which is now closed.    They are trying to get him on a waiting list for a residential placement. They do not have any agency or services, not being connected with [employer].  She did reach out to them, and they came to their home, but she says that he was not accepted, and they were sent to another program, but she could not make the appointment.  She says she is trying to get another appointment, but has not been able to reach them. She has a  through "[facility] [facility] but they do not take her calls, she says.  She has spoken with the SW at the program, who told them that a nursing home would be the only option they know of.     Developmentally, he did not speak until 3 years old.  She knows that his IQ had gone down over time.      1 1/2 years ago -   yells at night, neighbors complaining    [facility] - [place]    SW Iva [place] Moultrie Hakeem is a 30 y/o AA male, currently living in [place] with mother and father, attending day program at [facility], has PMH of Asthma, has a psychiatric history significant for Autism, Moderate-Severe Intellectual Disability, Schizophrenia (without any specific detail of this), has a history of inpatient psychiatric admission- last admitted 3 years ago, ongoing Nicotine dependence, no drug or alcohol abuse, no legal issues, presented to ed via ems from [facility] day [facility].     The only information which can be obtained in regards to precipitating issue for presentation is from Emergency Department chart, which states "frequent verbal threatening outburst...reportedly was displaying violent outbursts and threatening behavior...mother is fearful when he has similar out burst at home...behavior is impulsive but he is not know to hurt any one, no does he have suicidal tendency...mother believes that he needs to be admitted into the hospital."     This writer spoke with patient, who was fully cooperative with interview, although his reliability as an informant is limited by his intellectual deficits. He says that he is here because "I was screaming at the front table." When asked for more details, he says that he had hurt himself by accident on a table at his day program and yelled. He also says that the day before, he was getting upset about something, but he can't remember why he was upset.  However, he denies getting physical or aggressive with anyone, and denies any thoughts of self-harm, suicide, violence, aggression or homicidality. He says that his mood has been "good," that he has been taking his medication and denies all other symptoms of sariah, anxiety, psychosis, post-traumatic stress disorder or OCD. He asks if he is able to go home.     Spoke with pt's mother (Mrs. Peguero - [phone], [phone]), who says that Hakeem goes to a day program for the past 1 1/2 years. They have not had any trouble, but on Wednesday, they called the mother asking if they could pick him up because he was yelling and "wasn't himself." There was no mention of any violence/aggression nor psychotic symptoms at that time. Yesterday, he went to program and didn't have any issues. At night, he has been having trouble sleeping, staying awake smoking his "Black and Milds," 10-15 per night, and she believes that it keeps him up.  He tends to get very anxious and aggressive when he cannot get his cigars.  They worry that he will drop it and cause a fire, so they tell him to come every 2 hours for one, but he comes every 15 minutes.  They say that for months, he will bang on the door to request a cigar, yelling at them if he doesn't get his cigar.  They end up giving it to him when he asks.  Today, she is not sure what happened, but knows that he was sent from the program, which is now closed. She is trying to get him on a waiting list for a residential placement. They do not have any agency or services, not being connected with [facility].  She did reach out to them, and they came to their home, but she says that he was not accepted, and they were sent to [facility], but she could not make the appointment because of her high blood pressure and she is trying to get another appointment, but has not been able to reach them. She has a  through "[facility] [facility] [employer] but they do not take her calls, she says.  She has spoken with the  at his day program that referred them today, who told them that a nursing home would be the only option they know of. She says that patient has had episodes of physical aggression when he was not given his cigars, the last time being 1 1/2 years ago.  He does not manifest any delusional beliefs, nor any evidence of internal stimuli such as perceptual changes. She says the primary issue is his chronic poor frustration tolerance. However, he also stays up often, smoking his cigars, and he will make noise at times, for unknown reason, and this can upset their neighbors. She is not sure what to do for him, denying imminent sa Hakeem is a 30 y/o AA male, currently living in [place] with mother and father, attending day program at [facility], has PMH of Asthma, has a psychiatric history significant for Autism, Moderate-Severe Intellectual Disability, Schizophrenia (without any specific detail of this), has a history of inpatient psychiatric admission- last admitted 3 years ago, ongoing Nicotine dependence, no drug or alcohol abuse, no legal issues, presented to ed via ems from [facility] day [facility].     The only information which can be obtained in regards to precipitating issue for presentation is from Emergency Department chart, which states "frequent verbal threatening outburst...reportedly was displaying violent outbursts and threatening behavior...mother is fearful when he has similar out burst at home...behavior is impulsive but he is not know to hurt any one, no does he have suicidal tendency...mother believes that he needs to be admitted into the hospital."     This writer spoke with patient, who was fully cooperative with interview, although his reliability as an informant is limited by his intellectual deficits. He says that he is here because "I was screaming at the front table." When asked for more details, he says that he had hurt himself by accident on a table at his day program and yelled. He also says that the day before, he was getting upset about something, but he can't remember why he was upset.  However, he denies getting physical or aggressive with anyone, and denies any thoughts of self-harm, suicide, violence, aggression or homicidality. He says that his mood has been "good," that he has been taking his medication and denies all other symptoms of sariah, anxiety, psychosis, post-traumatic stress disorder or OCD. He asks if he is able to go home.     Spoke with pt's mother (Mrs. Peguero - [phone], [phone]), who says that Hakeem goes to a day program for the past 1 1/2 years. They have not had any trouble, but on Wednesday, they called the mother asking if they could pick him up because he was yelling and "wasn't himself." There was no mention of any violence/aggression nor psychotic symptoms at that time. Yesterday, he went to program and didn't have any issues. At night, he has been having trouble sleeping, staying awake smoking his "Black and Milds," 10-15 per night, and she believes that it keeps him up.  He tends to get very anxious and aggressive when he cannot get his cigars.  They worry that he will drop it and cause a fire, so they tell him to come every 2 hours for one, but he comes every 15 minutes.  They say that for months, he will bang on the door to request a cigar, yelling at them if he doesn't get his cigar.  They end up giving it to him when he asks.  Today, she is not sure what happened, but knows that he was sent from the program, which is now closed. She is trying to get him on a waiting list for a residential placement. They do not have any agency or services, not being connected with [facility].  She did reach out to them, and they came to their home, but she says that he was not accepted, and they were sent to [facility], but she could not make the appointment because of her high blood pressure and she is trying to get another appointment, but has not been able to reach them. She has a  through "[facility] but they do not take her calls, she says.  She has spoken with the  at his day program that referred them today, who told them that a nursing home would be the only option they know of. She says that patient has had episodes of physical aggression when he was not given his cigars, the last time being 1 1/2 years ago.  He does not manifest any delusional beliefs, nor any evidence of internal stimuli such as perceptual changes. She says the primary issue is his chronic poor frustration tolerance. However, he also stays up often, smoking his cigars, and he will make noise at times, for unknown reason, and this can upset their neighbors. She is not sure what to do for him, denying imminent safety concerns at this time, but she is upset about his long term plans and does not know what direction to go in. This writer discussed treatment options including her discussing her concerns about long term options with his current providers, but she says that she would like for us to be able to speak with his program before she will accept him home as she does not know what happened.

## 2019-04-05 NOTE — ED BEHAVIORAL HEALTH ASSESSMENT NOTE - NS ED BHA PLAN TR MEDICATIONS2 FT
The patient will continue current medications. Adjustments will be made by the psych NP if required.

## 2019-04-05 NOTE — ED PROVIDER NOTE - PROGRESS NOTE DETAILS
Tele-psych with DR Vail, will re-evaluate the patient in the morning Seen by Psych Dr. Post who cleared pt for discharge home.

## 2019-04-05 NOTE — ED BEHAVIORAL HEALTH ASSESSMENT NOTE - OTHER
15 [address]HA  [facility] [phone] nicotine addiction defer to Emergency Department assessment concrete n/a

## 2019-04-06 VITALS
RESPIRATION RATE: 14 BRPM | TEMPERATURE: 98 F | DIASTOLIC BLOOD PRESSURE: 81 MMHG | OXYGEN SATURATION: 96 % | HEART RATE: 91 BPM | SYSTOLIC BLOOD PRESSURE: 121 MMHG

## 2019-04-06 DIAGNOSIS — F84.0 AUTISTIC DISORDER: ICD-10-CM

## 2019-04-06 PROBLEM — Q21.1 ATRIAL SEPTAL DEFECT: Chronic | Status: ACTIVE | Noted: 2017-08-31

## 2019-04-06 PROBLEM — R41.89 OTHER SYMPTOMS AND SIGNS INVOLVING COGNITIVE FUNCTIONS AND AWARENESS: Chronic | Status: ACTIVE | Noted: 2017-08-31

## 2019-04-06 LAB
APPEARANCE UR: CLEAR — SIGNIFICANT CHANGE UP
BACTERIA # UR AUTO: ABNORMAL
BILIRUB UR-MCNC: NEGATIVE — SIGNIFICANT CHANGE UP
COLOR SPEC: YELLOW — SIGNIFICANT CHANGE UP
DIFF PNL FLD: NEGATIVE — SIGNIFICANT CHANGE UP
GLUCOSE UR QL: NEGATIVE MG/DL — SIGNIFICANT CHANGE UP
KETONES UR-MCNC: NEGATIVE — SIGNIFICANT CHANGE UP
LEUKOCYTE ESTERASE UR-ACNC: NEGATIVE — SIGNIFICANT CHANGE UP
NITRITE UR-MCNC: NEGATIVE — SIGNIFICANT CHANGE UP
PH UR: 6 — SIGNIFICANT CHANGE UP (ref 5–8)
PROT UR-MCNC: 15 MG/DL
SP GR SPEC: 1.02 — SIGNIFICANT CHANGE UP (ref 1.01–1.02)
UROBILINOGEN FLD QL: NEGATIVE MG/DL — SIGNIFICANT CHANGE UP
WBC UR QL: SIGNIFICANT CHANGE UP

## 2019-04-06 PROCEDURE — 80053 COMPREHEN METABOLIC PANEL: CPT

## 2019-04-06 PROCEDURE — 81001 URINALYSIS AUTO W/SCOPE: CPT

## 2019-04-06 PROCEDURE — 93005 ELECTROCARDIOGRAM TRACING: CPT

## 2019-04-06 PROCEDURE — 85027 COMPLETE CBC AUTOMATED: CPT

## 2019-04-06 PROCEDURE — 36415 COLL VENOUS BLD VENIPUNCTURE: CPT

## 2019-04-06 PROCEDURE — 99285 EMERGENCY DEPT VISIT HI MDM: CPT | Mod: 25

## 2019-04-06 PROCEDURE — 80307 DRUG TEST PRSMV CHEM ANLYZR: CPT

## 2019-04-06 RX ORDER — NICOTINE POLACRILEX 2 MG
1 GUM BUCCAL DAILY
Qty: 0 | Refills: 0 | Status: DISCONTINUED | OUTPATIENT
Start: 2019-04-06 | End: 2019-04-09

## 2019-04-06 RX ADMIN — Medication 1 PATCH: at 01:21

## 2019-04-06 NOTE — ED ADULT NURSE REASSESSMENT NOTE - NS ED NURSE REASSESS COMMENT FT1
Pt received awake and alert, participated in tele psych interview. Pt is lying on stretcher, is cooperative with staff and calm at this time. Pt reports feeling ok at this time. 1:1 with pt for safety. Awaiting dispo.
maintained on 1:1 pending tele doc re eval
Plan for pt to remain in ED overnight for evaluation for placement in the AM discussed. Pt is cooperative and accepting of plan. Nicotine patch applied to skin. Pt brought to quiet room and encouraged to attempt to sleep. 1:1 remains with pt for safety. Pt intermittently laughs for no apparent reason and appears to see things that are not visible to anyone else in the room. Pt denies any physical complaints at this time.

## 2020-01-30 ENCOUNTER — EMERGENCY (EMERGENCY)
Facility: HOSPITAL | Age: 30
LOS: 1 days | Discharge: HOME | End: 2020-01-30
Attending: EMERGENCY MEDICINE | Admitting: EMERGENCY MEDICINE
Payer: MEDICAID

## 2020-01-30 VITALS
RESPIRATION RATE: 16 BRPM | OXYGEN SATURATION: 99 % | TEMPERATURE: 98 F | DIASTOLIC BLOOD PRESSURE: 74 MMHG | SYSTOLIC BLOOD PRESSURE: 132 MMHG | HEART RATE: 89 BPM

## 2020-01-30 VITALS
DIASTOLIC BLOOD PRESSURE: 88 MMHG | TEMPERATURE: 99 F | OXYGEN SATURATION: 99 % | RESPIRATION RATE: 14 BRPM | HEIGHT: 72 IN | HEART RATE: 93 BPM | WEIGHT: 259.93 LBS | SYSTOLIC BLOOD PRESSURE: 136 MMHG

## 2020-01-30 PROCEDURE — 99283 EMERGENCY DEPT VISIT LOW MDM: CPT

## 2020-01-30 RX ORDER — ACETAMINOPHEN 500 MG
2 TABLET ORAL
Qty: 0 | Refills: 0 | DISCHARGE

## 2020-01-30 RX ORDER — DIVALPROEX SODIUM 500 MG/1
2 TABLET, DELAYED RELEASE ORAL
Qty: 0 | Refills: 0 | DISCHARGE

## 2020-01-30 RX ORDER — RISPERIDONE 4 MG/1
0 TABLET ORAL
Qty: 0 | Refills: 0 | DISCHARGE

## 2020-01-30 RX ORDER — TEMAZEPAM 15 MG/1
1 CAPSULE ORAL
Qty: 0 | Refills: 0 | DISCHARGE

## 2020-01-30 RX ORDER — BENZTROPINE MESYLATE 1 MG
0 TABLET ORAL
Qty: 0 | Refills: 0 | DISCHARGE

## 2020-01-30 RX ORDER — CHLORPROMAZINE HCL 10 MG
0 TABLET ORAL
Qty: 0 | Refills: 0 | DISCHARGE

## 2020-01-30 RX ORDER — DIVALPROEX SODIUM 500 MG/1
3 TABLET, DELAYED RELEASE ORAL
Qty: 0 | Refills: 0 | DISCHARGE

## 2020-01-30 NOTE — ED PROVIDER NOTE - OBJECTIVE STATEMENT
Pt is a 28 yo male with pmhx of of Autism ASD Cognitive impairment BIBEMS from day program for psych eval and aggression. Pt calm and cooperative able to follow directions. Responds yes/no to questions secondary to limited verbal skills. As per note from [facility] pt was in class and had unprovoked out break, yelling cursing spitting and threw computer head transferred to er.

## 2020-01-30 NOTE — ED PROVIDER NOTE - PROGRESS NOTE DETAILS
spoke with pt's mother states she will not be able to pick him up because she does not drive long distances so ambulance set up for transfer pt calm and cooperative mother will be home to [place] address confirmed with mother

## 2020-01-30 NOTE — ED ADULT NURSE NOTE - OBJECTIVE STATEMENT
patient is autistic was at  this evening and he was transferred to the emergency department because he threw a computer headphone and started yelling. Pt calm and follows commands, Asking for food at present time.

## 2020-01-30 NOTE — ED ADULT NURSE NOTE - HPI (INCLUDE ILLNESS QUALITY, SEVERITY, DURATION, TIMING, CONTEXT, MODIFYING FACTORS, ASSOCIATED SIGNS AND SYMPTOMS)
patient had an outburst today at his program and was sent to the emergency department. Pt has a history of autism, schizophrenia, pervasive developmental disorder and intermittent explosive disorder. Lives with parents

## 2020-01-30 NOTE — ED ADULT NURSE NOTE - REASON FOR REFERRAL
patient murtaza was at a program today and threw a computer headphone swinging his arms and started yelling. sent here for out busts

## 2020-01-30 NOTE — ED ADULT NURSE NOTE - NSFALLRSKPASTHIST_ED_ALL_ED
ER Documentation


Chief Complaint


Chief Complaint





GENERALIZED ITCHY RASH WITH FEVER X 5 DAYS





HPI


This 39-year-old female presents with 5-day history of blisters and a rash which


are itchy on the face, trunk and extremities.  She denies any measured fevers 


although felt febrile.  She denies any cough, shortness breath, vomiting, 


abdominal pain, urinary complaints.





ROS


All systems reviewed and are negative except as per history of present illness.





Medications


Home Meds


Active Scripts


Calamine* (Calamine*) 120 Ml Lotion, 1 APPLIC TOP Q4H for RASH for 7 Days, EA


   Prov:LUDWIN VELOZ MD         1/8/19


Diphenhydramine Hcl* (Benadryl*) 25 Mg Cap, 25 MG PO Q6, #20 CAP


   Prov:LUDWIN VELOZ MD         1/8/19


Acetaminophen* (Tylophen*) 500 Mg Capsule, 1 CAP PO Q6H PRN for PAIN AND OR 


ELEVATED TEMP, #20 CAP


   Prov:LUDWIN VELOZ MD         1/8/19





Allergies


Allergies:  


Coded Allergies:  


     No Known Allergies (Verified  Allergy, Mild, 6/19/12)


     No Known Drug Allergy (Unverified  Allergy, Unknown, 10/3/14)





PMhx/Soc


History of Surgery:  Yes (C [facility], APPENDECTOMY 2002)


Anesthesia Reaction:  No


Hx Neurological Disorder:  No


Hx Respiratory Disorders:  No


Hx Cardiac Disorders:  No


Hx Psychiatric Problems:  No


Hx Miscellaneous Medical Probl:  Yes (HERNIA)


Hx Alcohol Use:  No


Hx Substance Use:  No


Hx Tobacco Use:  No





FmHx


Family History:  No diabetes, No coronary disease, No other





Physical Exam


Vitals





Vital Signs


  Date      Temp  Pulse  Resp  B/P (MAP)   Pulse Ox  O2          O2 Flow    FiO2


Time                                                 Delivery    Rate


    1/8/19  99.4     81    16      135/78        99  Room Air


     18:47                           (97)


    1/8/19  99.4     86    18      143/80        99


     16:56                          (101)





Physical Exam


Const:   No acute distress


Head:   Atraumatic 


Eyes:    Normal Conjunctiva


ENT:    Normal External Ears, Nose and Mouth.


Neck:               Full range of motion. No meningismus.


Resp:   Clear to auscultation bilaterally


Cardio:   Regular rate and rhythm, no murmurs


Abd:    Soft, non tender, non distended. Normal bowel sounds


Skin:   No petechiae or purpura.  Scattered vesicular erythematous lesions on 


the face, trunk and extremities.


Back:   No midline or flank tenderness


Ext:    No cyanosis, or edema


Neur:   Awake and alert


Psych:    Normal Mood and Affect





Procedures/MDM


She presents with signs and symptoms of uncomplicated varicella.  Will treat 


with Benadryl, Tylenol, Calan, primary care follow-up, return precautions and 


measures to prevent to unvaccinated or other susceptible individuals.  The 


patient was stable with no new complaints during the ER course. Clinically, 


there is no current evidence to suggest meningitis, sepsis, acute abdomen, 


pneumonia, stroke,  acute coronary syndrome, pulmonary embolism, aortic 


dissection or any other emergent condition appearing to require further 


evaluation or hospitalization.  Patient counseled regarding my diagnostic 


impression and care plan. Prior to discharge all questions answered. Pt agrees 


with treatment plan and understands strict return precautions. Pt is instructed 


to follow up with primary care provider within 24-48 hours. Precautionary 


instructions provided including instructions to return to the ER if not 


improving or for any worsening or changing symptoms or concerns.





Departure


Diagnosis:  


   Primary Impression:  


   Varicella


   Varicella complications:  without complication  Qualified Codes:  B01.9 - 


   Varicella without complication


Condition:  Stable


Patient Instructions:  Chickenpox





Additional Instructions:  


 Cheque otro vez con rios doctor primario en el proximo christensen or regresa para mas o


nueva simptomas. ES CONTIAGIOSA A LOS PRAMOD SIN VACCUNES .











LUDWIN VELOZ MD              Jan 8, 2019 17:22
unable to determine

## 2020-01-30 NOTE — ED PROVIDER NOTE - PATIENT PORTAL LINK FT
You can access the FollowMyHealth Patient Portal offered by [facility] by registering at the following website: [email or web address]. By joining [facility]’s FollowMyHealth portal, you will also be able to view your health information using other applications (apps) compatible with our system.

## 2020-01-30 NOTE — ED PROVIDER NOTE - CLINICAL SUMMARY MEDICAL DECISION MAKING FREE TEXT BOX
Pt is a 30 yo male sent in for agitation now calm will call parents Marcia: Pt is a 28 yo male sent in for agitation now calm will call parents, parents to take back home

## 2020-01-30 NOTE — ED ADULT NURSE NOTE - NS TRANSFER PATIENT BELONGINGS
sweatpants, sweater, sneakers placed in belongings bag labeled. patient wearing underwear tshift and socks/Clothing

## 2020-01-30 NOTE — ED PROVIDER NOTE - CONSTITUTIONAL, MLM
normal... Well appearing, awake, alert, oriented to person, place, not time/situation and in no apparent distress. calm and cooperative follows commands but limited with answers and history obese male

## 2020-01-30 NOTE — ED ADULT NURSE REASSESSMENT NOTE - NS ED NURSE REASSESS COMMENT FT1
report received from off going RN, pt resting in bed, denies any complaints, pt to be DC home, awaiting call back from transfer center. pts mother states she is unable to drive and doesn't feel comfortable driving this far at night.

## 2020-01-30 NOTE — ED ADULT NURSE REASSESSMENT NOTE - NS ED NURSE REASSESS COMMENT FT1
informed Mary patient's mother we are attempting to get patient an ambulette home. Informed mother we will keep her informed.

## 2020-01-30 NOTE — ED ADULT NURSE REASSESSMENT NOTE - NS ED NURSE REASSESS COMMENT FT1
pt walked out of ED into medicaid cab w/o telling any employee, Reta ( ) called logistic care and confirmed patient was  dropped off and is home. Pt left w/o signing discharge.

## 2020-01-30 NOTE — ED ADULT NURSE NOTE - INTERVENTIONS DEFINITIONS
Monitor gait and stability/Non-slip footwear when patient is off stretcher/Monitor for mental status changes and reorient to person, place, and time/Reinforce activity limits and safety measures with patient and family/Room bathroom lighting operational/Physically safe environment: no spills, clutter or unnecessary equipment

## 2020-01-31 PROBLEM — F20.9 SCHIZOPHRENIA, UNSPECIFIED: Chronic | Status: ACTIVE | Noted: 2019-04-06

## 2020-01-31 PROBLEM — G47.00 INSOMNIA, UNSPECIFIED: Chronic | Status: ACTIVE | Noted: 2019-04-06

## 2020-01-31 PROBLEM — J45.909 UNSPECIFIED ASTHMA, UNCOMPLICATED: Chronic | Status: ACTIVE | Noted: 2019-04-06

## 2020-04-01 ENCOUNTER — OUTPATIENT (OUTPATIENT)
Dept: OUTPATIENT SERVICES | Facility: HOSPITAL | Age: 30
LOS: 1 days | End: 2020-04-01
Payer: MEDICAID

## 2020-04-01 PROCEDURE — G9001: CPT

## 2020-04-24 DIAGNOSIS — Z71.89 OTHER SPECIFIED COUNSELING: ICD-10-CM

## 2022-01-01 ENCOUNTER — EMERGENCY (EMERGENCY)
Facility: HOSPITAL | Age: 32
LOS: 0 days | End: 2022-05-19
Attending: EMERGENCY MEDICINE
Payer: MEDICAID

## 2022-01-01 VITALS — HEIGHT: 72 IN | WEIGHT: 315 LBS | TEMPERATURE: 98 F

## 2022-01-01 DIAGNOSIS — E11.9 TYPE 2 DIABETES MELLITUS WITHOUT COMPLICATIONS: ICD-10-CM

## 2022-01-01 DIAGNOSIS — Z20.822 CONTACT WITH AND (SUSPECTED) EXPOSURE TO COVID-19: ICD-10-CM

## 2022-01-01 DIAGNOSIS — I50.9 HEART FAILURE, UNSPECIFIED: ICD-10-CM

## 2022-01-01 DIAGNOSIS — I46.9 CARDIAC ARREST, CAUSE UNSPECIFIED: ICD-10-CM

## 2022-01-01 LAB
RAPID RVP RESULT: SIGNIFICANT CHANGE UP
SARS-COV-2 RNA SPEC QL NAA+PROBE: SIGNIFICANT CHANGE UP

## 2022-01-01 PROCEDURE — 92950 HEART/LUNG RESUSCITATION CPR: CPT

## 2022-01-01 PROCEDURE — 99285 EMERGENCY DEPT VISIT HI MDM: CPT | Mod: 25

## 2022-05-19 NOTE — ED PROVIDER NOTE - PHYSICAL EXAMINATION
Gen: unresponsive, flaccid extremities   Head: ncat, fixed/dilated pupils b/l  Neck: supple, no lymphadenopathy, no midline deviation  Heart: rrr, no m/r/g  Lungs: CTA b/l, no rales/ronchi/wheezes  Abd: soft, nontender, non-distended, no rebound or guarding  Ext: no clubbing/cyanosis/edema  Neuro: flaccid extremities, no spontaneous movement/neuro activity

## 2022-05-19 NOTE — ED ADULT NURSE REASSESSMENT NOTE - NS ED NURSE REASSESS COMMENT FT1
EMS notification to the hospital received at 5:46 am. As per EMS, family called ambulance after witnessed patient laying on the floor.  Patient intubated by EMS in field. size 7.5 ET tube, 23 at lip. Upon EMS arrival, patient  in asystole and went into PEA. Patient received 6 epi and 2 bicarb in field. Patient in PEA on arrival to the ED. Patient received with right and left IO. As per EMS, patient has PMH of CHF, DM, HTN. Patient given epi at 5:55. Pulse check at 5:55, patient in PEA. TOD 5:56 am pronounced by Dr. diogo Goff. Code assisted by Angelita Botello RN, Shira Dennis RN, Nathaniel OLIVA RN, Megan VILLA RN. Respiratory therapist Lynnette at bedside. Forrest notified at 6:04 am. Referral # [id number].

## 2022-05-19 NOTE — ED ADULT NURSE NOTE - OBJECTIVE STATEMENT
EMS notification to the hospital received at 5:46 am. As per EMS, family called ambulance after witnessed patient laying on the floor.  Patient intubated by EMS in field. size 7.5 ET tube, 23 at lip. Upon EMS arrival, patient  in asystole and went into PEA. Patient received 6 epi and 2 bicarb in field. Patient in PEA on arrival to the ED. Patient received with 16g right and left IO. As per EMS, patient has PMH of CHF, DM, HTN. Upon arrival to ED, patient given another epi at 5:55. At 5:55 had pulse check. Patient noted in PEA. Dr. Goff at bedside. TOD 5:56 pronounced by Dr. Goff.

## 2022-05-19 NOTE — ED PROVIDER NOTE - OBJECTIVE STATEMENT
33 yo M bibems in cardiac arrest.  EMS call was for unresponsiveness/not breathing.  EMS arrived and found pt. in asystole and started CPR, eventually PEA was obtained, but no ROSC achieved over 40 minutes of CPR.  Pt. pronounced at 5:56 AM in ER.  FS was 190 on scene.    ROS: unobtainable from patient   PMH: CHF, DM; Meds: See EMR for list; SH: Denies smoking/drinking/drug use

## 2022-05-19 NOTE — ED PROVIDER NOTE - CLINICAL SUMMARY MEDICAL DECISION MAKING FREE TEXT BOX
33 yo M with cardiac arrest, pronounced at 5:56 AM  -call ME and notify family, will likely be and ME case

## 2022-05-19 NOTE — ED ADULT TRIAGE NOTE - CHIEF COMPLAINT QUOTE
BIBA in cardiac arrest with compressions being performed. as per EMS, pt was found on floor by family at 5:30AM with agonal breathing. downtime approximately 40 minutes with EMS. IO in right and left lower legs.   with EMS. received 6 epi, bicarb, calcium with EMS. intubated 7.5 ET tube and 23 lip line with EMS  hx: CHF, HTN, DM

## 2022-05-19 NOTE — ED PROVIDER NOTE - IV ALTEPLASE EXCL ABS HIDDEN
show [No Pertinent Cardiac History] : no history of aortic stenosis, atrial fibrillation, coronary artery disease, recent myocardial infarction, or implantable device/pacemaker [No Pertinent Pulmonary History] : no history of asthma, COPD, sleep apnea, or smoking [No Adverse Anesthesia Reaction] : no adverse anesthesia reaction in self or family member [(Patient denies any chest pain, claudication, dyspnea on exertion, orthopnea, palpitations or syncope)] : Patient denies any chest pain, claudication, dyspnea on exertion, orthopnea, palpitations or syncope [FreeTextEntry1] : Bilateral intraocular lens replacement [FreeTextEntry2] : November 3 and November 17 [FreeTextEntry3] : Dr. Pritchett [FreeTextEntry4] : Patient has bilateral cataracts and to have intraocular lens replacement\par \par He is active no cardiovascular symptoms\par \par Hypertension is well controlled on current meds\par \par Hyperlipidemia controlled on current meds

## 2022-06-08 LAB — GLUCOSE BLDC GLUCOMTR-MCNC: 171 MG/DL — HIGH (ref 70–99)

## 2023-11-20 NOTE — ED ADULT NURSE NOTE - PAIN RATING/NUMBER SCALE (0-10): REST
Writer spoke to patient. I provided Dr. Henao message, informed RX sent to her pharmay and I also provided plan of care recommendations. Patient verbalized understanding and denies any other questions or concerns at this time.      Thank you,  OFELIA Pierre   0

## 2023-12-28 NOTE — ED ADULT NURSE NOTE - OTHER
follows commands normal when he speaks I have reviewed and confirmed nurses' notes for patient's medications, allergies, medical history, and surgical history.

## 2025-07-19 NOTE — ED BEHAVIORAL HEALTH ASSESSMENT NOTE - PAST PSYCHOTROPIC MEDICATION
[FreeTextEntry1] : Lumbar muscle pain DJD lumbosacral spine  The patient will continue symptomatic treatment and he will return on a as needed basis. unknown